# Patient Record
(demographics unavailable — no encounter records)

---

## 2019-08-13 NOTE — ED GENERAL
General


Chief Complaint:  Dizziness/Syncope


Stated Complaint:  DIZZY, NAUSEA


Nursing Triage Note:  


Patient c/o dizziness, feeling hot and unable to cool down. States she vomited 2




or 3 times on Sunday but has not vomited since. Reports that she hasn't been 


able to get the dizziness to stop.


Nursing Sepsis Screen:  No Definite Risk


Source of Information:  Patient





History of Present Illness


Date Seen by Provider:  Aug 13, 2019


Time Seen by Provider:  16:30


Initial Comments


Patient is a 24-year-old female presents with lightheadedness and dizziness 

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 

Patient also states that driving between homes, she was in a car that was not in

her condition. She reports dizziness and nausea which resolved. Denies chest 

pain palpitations, syncope. No other acute symptoms or complaints. Last 

menstrual period was 2 months ago. Reports history of irregular periods.


Timing/Duration:  1-3 Hours


Severity:  Moderate


Associated Systoms:  Diaphoresis, Nausea/Vomiting





Allergies and Home Medications


Patient Home Medication List


Home Medication List Reviewed:  Yes





Review of Systems


Review of Systems


Constitutional:  see HPI


EENTM:  see HPI


Respiratory:  see HPI


Cardiovascular:  see HPI


Genitourinary:  see HPI


Musculoskeletal:  see HPI


Skin:  see HPI


Psychiatric/Neurological:  See HPI


Hematologic/Lymphatic:  See HPI


Immunological/Allergic:  see HPI





Past Medical-Social-Family Hx


Patient Social History


Alcohol Use:  Denies Use


Recreational Drug Use:  No


Smoking Status:  Current Everyday Smoker


Type Used:  Cigarettes


2nd Hand Smoke Exposure:  No


Recent Foreign Travel:  No


Contact w/Someone Who Travel:  No


Recent Infectious Disease Expo:  No


Recent Hopitalizations:  No


Physical Abuse:  No


Sexual Abuse:  No


Mistreated:  No


Fear:  No





Seasonal Allergies


Seasonal Allergies:  No





Past Medical History


Surgeries:  No


Respiratory:  No


Cardiac:  No


Neurological:  No


Genitourinary:  No


Gastrointestinal:  No


Musculoskeletal:  No


Endocrine:  No


HEENT:  No


Cancer:  No


Psychosocial:  No


Integumentary:  No


Blood Disorders:  No





Physical Exam


Vital Signs





Vital Signs - First Documented








 8/13/19





 16:20


 


Temp 98.1


 


Pulse 93


 


Resp 18


 


B/P (MAP) 140/90 (107)


 


Pulse Ox 93


 


O2 Delivery Room Air





Capillary Refill : Less Than 3 Seconds


Height, Weight, BMI


Height: 5'3.50"


Weight: 240lbs. oz. 108.625873fu;  BMI


Method:Stated


General Appearance:  No Apparent Distress, WD/WN


Eyes:  Bilateral Eye Normal Inspection


HEENT:  PERRL/EOMI


Neck:  Full Range of Motion, Supple


Respiratory:  Chest Non Tender, Lungs Clear





Focused Exam


Sepsis Stage:  Ruled Out





Progress/Results/Core Measures


Suspected Sepsis


Recent Fever Within 48 Hours:  No


Infection Criteria Present:  None


New/Unexplained  Altered Menta:  No


Sepsis Screen:  No Definite Risk


SIRS


Temperature:98.1 


Pulse: 93 


Respiratory Rate: 18


 


Blood Pressure 140 /90 


Mean: 107





Results/Orders


Lab Results





Laboratory Tests








Test


 8/13/19


16:35 Range/Units


 


 


Urine Color YELLOW   


 


Urine Clarity CLEAR   


 


Urine pH 6.5  5-9  


 


Urine Specific Gravity <=1.005  1.016-1.022  


 


Urine Protein NEGATIVE  NEGATIVE  


 


Urine Glucose (UA) NEGATIVE  NEGATIVE  


 


Urine Ketones NEGATIVE  NEGATIVE  


 


Urine Nitrite NEGATIVE  NEGATIVE  


 


Urine Bilirubin NEGATIVE  NEGATIVE  


 


Urine Urobilinogen 0.2  NORMAL  MG/DL


 


Urine Leukocyte Esterase NEGATIVE  NEGATIVE  


 


Urine RBC (Auto) NEGATIVE  NEGATIVE  


 


Urine RBC NONE   /HPF


 


Urine WBC NONE   /HPF


 


Urine Crystals NONE   /LPF


 


Urine Bacteria NEGATIVE   /HPF


 


Urine Casts NONE   /LPF


 


Urine Mucus NEGATIVE   /LPF


 


Urine Culture Indicated NO   








My Orders





Orders - DARSHANA GAMBINO DO


Ua Culture If Indicated (8/13/19 16:28)


Urine Pregnancy Bedside (8/13/19 16:28)





Vital Signs/I&O











 8/13/19





 16:20


 


Temp 98.1


 


Pulse 93


 


Resp 18


 


B/P (MAP) 140/90 (107)


 


Pulse Ox 93


 


O2 Delivery Room Air





Capillary Refill : Less Than 3 Seconds








Blood Pressure Mean:                    107











Departure


Communication (Admissions)


Patient cooled off the ED. Denies medications. Chlorates oral intakes. UA 

specific gravity is low, hCG is negative. Recommend supportive care. Courtesy  

work note provided





Impression





   Primary Impression:  


   Heat exhaustion


Disposition:  01 HOME, SELF-CARE


Condition:  Improved





Departure-Patient Inst.


Referrals:  


NO,LOCAL PHYSICIAN (PCP)


Primary Care Physician


Patient Instructions:  Heat Exhaustion and Heat Stroke (DC)





Add. Discharge Instructions:  


Please stay in a cool air-conditioned environment for the next 24 hours avoid 

excessive activity and heat exposure. Increase fluids and follow-up with your 

PCP as needed.





All discharge instructions reviewed with patient and/or family. Voiced 

understanding.











DARSHANA GAMBINO DO                   Aug 13, 2019 17:31